# Patient Record
Sex: FEMALE | Race: WHITE | NOT HISPANIC OR LATINO | URBAN - METROPOLITAN AREA
[De-identification: names, ages, dates, MRNs, and addresses within clinical notes are randomized per-mention and may not be internally consistent; named-entity substitution may affect disease eponyms.]

---

## 2017-01-25 ENCOUNTER — OUTPATIENT (OUTPATIENT)
Dept: OUTPATIENT SERVICES | Facility: HOSPITAL | Age: 67
LOS: 1 days | End: 2017-01-25

## 2017-09-16 ENCOUNTER — TRANSCRIPTION ENCOUNTER (OUTPATIENT)
Age: 67
End: 2017-09-16

## 2020-06-04 ENCOUNTER — TRANSCRIPTION ENCOUNTER (OUTPATIENT)
Age: 70
End: 2020-06-04

## 2020-08-11 ENCOUNTER — OUTPATIENT (OUTPATIENT)
Dept: OUTPATIENT SERVICES | Facility: HOSPITAL | Age: 70
LOS: 1 days | End: 2020-08-11

## 2020-10-30 ENCOUNTER — OUTPATIENT (OUTPATIENT)
Dept: OUTPATIENT SERVICES | Facility: HOSPITAL | Age: 70
LOS: 1 days | End: 2020-10-30

## 2021-01-19 ENCOUNTER — OUTPATIENT (OUTPATIENT)
Dept: OUTPATIENT SERVICES | Facility: HOSPITAL | Age: 71
LOS: 1 days | End: 2021-01-19

## 2021-04-27 ENCOUNTER — OUTPATIENT (OUTPATIENT)
Dept: OUTPATIENT SERVICES | Facility: HOSPITAL | Age: 71
LOS: 1 days | End: 2021-04-27

## 2021-06-30 ENCOUNTER — OUTPATIENT (OUTPATIENT)
Dept: OUTPATIENT SERVICES | Facility: HOSPITAL | Age: 71
LOS: 1 days | End: 2021-06-30

## 2021-08-30 ENCOUNTER — OUTPATIENT (OUTPATIENT)
Dept: OUTPATIENT SERVICES | Facility: HOSPITAL | Age: 71
LOS: 1 days | End: 2021-08-30

## 2021-10-14 ENCOUNTER — APPOINTMENT (OUTPATIENT)
Dept: MAMMOGRAPHY | Facility: CLINIC | Age: 71
End: 2021-10-14
Payer: MEDICARE

## 2021-10-14 ENCOUNTER — APPOINTMENT (OUTPATIENT)
Dept: RADIOLOGY | Facility: CLINIC | Age: 71
End: 2021-10-14

## 2021-10-14 PROCEDURE — 77067 SCR MAMMO BI INCL CAD: CPT

## 2021-10-14 PROCEDURE — 77063 BREAST TOMOSYNTHESIS BI: CPT

## 2021-10-14 PROCEDURE — 77080 DXA BONE DENSITY AXIAL: CPT

## 2021-11-02 ENCOUNTER — OUTPATIENT (OUTPATIENT)
Dept: OUTPATIENT SERVICES | Facility: HOSPITAL | Age: 71
LOS: 1 days | End: 2021-11-02

## 2021-11-30 ENCOUNTER — OUTPATIENT (OUTPATIENT)
Dept: OUTPATIENT SERVICES | Facility: HOSPITAL | Age: 71
LOS: 1 days | End: 2021-11-30

## 2022-02-04 ENCOUNTER — OUTPATIENT (OUTPATIENT)
Dept: OUTPATIENT SERVICES | Facility: HOSPITAL | Age: 72
LOS: 1 days | End: 2022-02-04

## 2022-02-04 DIAGNOSIS — R73.09 OTHER ABNORMAL GLUCOSE: ICD-10-CM

## 2022-02-04 DIAGNOSIS — E78.5 HYPERLIPIDEMIA, UNSPECIFIED: ICD-10-CM

## 2022-02-04 DIAGNOSIS — I10 ESSENTIAL (PRIMARY) HYPERTENSION: ICD-10-CM

## 2022-05-13 ENCOUNTER — OUTPATIENT (OUTPATIENT)
Dept: OUTPATIENT SERVICES | Facility: HOSPITAL | Age: 72
LOS: 1 days | End: 2022-05-13

## 2022-05-13 DIAGNOSIS — E78.5 HYPERLIPIDEMIA, UNSPECIFIED: ICD-10-CM

## 2022-05-13 DIAGNOSIS — I10 ESSENTIAL (PRIMARY) HYPERTENSION: ICD-10-CM

## 2022-05-13 DIAGNOSIS — R73.03 PREDIABETES: ICD-10-CM

## 2022-05-23 ENCOUNTER — OUTPATIENT (OUTPATIENT)
Dept: OUTPATIENT SERVICES | Facility: HOSPITAL | Age: 72
LOS: 1 days | End: 2022-05-23

## 2022-05-23 DIAGNOSIS — D75.1 SECONDARY POLYCYTHEMIA: ICD-10-CM

## 2022-08-16 ENCOUNTER — NON-APPOINTMENT (OUTPATIENT)
Age: 72
End: 2022-08-16

## 2022-08-19 ENCOUNTER — OUTPATIENT (OUTPATIENT)
Dept: OUTPATIENT SERVICES | Facility: HOSPITAL | Age: 72
LOS: 1 days | End: 2022-08-19
Payer: MEDICARE

## 2022-08-19 DIAGNOSIS — M79.81 NONTRAUMATIC HEMATOMA OF SOFT TISSUE: ICD-10-CM

## 2022-08-22 ENCOUNTER — RESULT REVIEW (OUTPATIENT)
Age: 72
End: 2022-08-22

## 2022-08-22 ENCOUNTER — APPOINTMENT (OUTPATIENT)
Dept: HEMATOLOGY ONCOLOGY | Facility: CLINIC | Age: 72
End: 2022-08-22

## 2022-08-22 VITALS
HEIGHT: 59 IN | OXYGEN SATURATION: 96 % | SYSTOLIC BLOOD PRESSURE: 126 MMHG | DIASTOLIC BLOOD PRESSURE: 94 MMHG | HEART RATE: 85 BPM | BODY MASS INDEX: 19.56 KG/M2 | WEIGHT: 97 LBS | TEMPERATURE: 98.2 F

## 2022-08-22 LAB
BASOPHILS # BLD AUTO: 0.04 K/UL — SIGNIFICANT CHANGE UP (ref 0–0.2)
BASOPHILS NFR BLD AUTO: 0.4 % — SIGNIFICANT CHANGE UP (ref 0–2)
EOSINOPHIL # BLD AUTO: 0.19 K/UL — SIGNIFICANT CHANGE UP (ref 0–0.5)
EOSINOPHIL NFR BLD AUTO: 1.7 % — SIGNIFICANT CHANGE UP (ref 0–6)
HCT VFR BLD CALC: 47.7 % — HIGH (ref 34.5–45)
HGB BLD-MCNC: 15.7 G/DL — HIGH (ref 11.5–15.5)
IMM GRANULOCYTES NFR BLD AUTO: 0.4 % — SIGNIFICANT CHANGE UP (ref 0–1.5)
LYMPHOCYTES # BLD AUTO: 19.3 % — SIGNIFICANT CHANGE UP (ref 13–44)
LYMPHOCYTES # BLD AUTO: 2.17 K/UL — SIGNIFICANT CHANGE UP (ref 1–3.3)
MCHC RBC-ENTMCNC: 31.7 PG — SIGNIFICANT CHANGE UP (ref 27–34)
MCHC RBC-ENTMCNC: 32.9 GM/DL — SIGNIFICANT CHANGE UP (ref 32–36)
MCV RBC AUTO: 96.2 FL — SIGNIFICANT CHANGE UP (ref 80–100)
MONOCYTES # BLD AUTO: 0.7 K/UL — SIGNIFICANT CHANGE UP (ref 0–0.9)
MONOCYTES NFR BLD AUTO: 6.2 % — SIGNIFICANT CHANGE UP (ref 2–14)
NEUTROPHILS # BLD AUTO: 8.08 K/UL — HIGH (ref 1.8–7.4)
NEUTROPHILS NFR BLD AUTO: 72 % — SIGNIFICANT CHANGE UP (ref 43–77)
NRBC # BLD: 0 /100 WBCS — SIGNIFICANT CHANGE UP (ref 0–0)
PLATELET # BLD AUTO: 342 K/UL — SIGNIFICANT CHANGE UP (ref 150–400)
RBC # BLD: 4.96 M/UL — SIGNIFICANT CHANGE UP (ref 3.8–5.2)
RBC # FLD: 12.8 % — SIGNIFICANT CHANGE UP (ref 10.3–14.5)
WBC # BLD: 11.22 K/UL — HIGH (ref 3.8–10.5)
WBC # FLD AUTO: 11.22 K/UL — HIGH (ref 3.8–10.5)

## 2022-08-22 PROCEDURE — 99204 OFFICE O/P NEW MOD 45 MIN: CPT

## 2022-08-22 RX ORDER — NIRMATRELVIR AND RITONAVIR 300-100 MG
20 X 150 MG & KIT ORAL
Qty: 30 | Refills: 0 | Status: DISCONTINUED | COMMUNITY
Start: 2022-06-21 | End: 2022-08-22

## 2022-08-22 RX ORDER — HYDROCHLOROTHIAZIDE 12.5 MG/1
12.5 CAPSULE ORAL
Qty: 90 | Refills: 0 | Status: ACTIVE | COMMUNITY
Start: 2022-05-13

## 2022-08-22 RX ORDER — MONTELUKAST 10 MG/1
10 TABLET, FILM COATED ORAL
Qty: 90 | Refills: 0 | Status: ACTIVE | COMMUNITY
Start: 2022-03-09

## 2022-08-22 RX ORDER — AMITRIPTYLINE HYDROCHLORIDE 75 MG/1
75 TABLET, FILM COATED ORAL
Qty: 90 | Refills: 0 | Status: ACTIVE | COMMUNITY
Start: 2022-08-05

## 2022-08-22 RX ORDER — POLYETHYLENE GLYCOL-3350 AND ELECTROLYTES 236; 6.74; 5.86; 2.97; 22.74 G/274.31G; G/274.31G; G/274.31G; G/274.31G; G/274.31G
236 POWDER, FOR SOLUTION ORAL
Qty: 4000 | Refills: 0 | Status: ACTIVE | COMMUNITY
Start: 2022-04-07

## 2022-08-22 RX ORDER — ALBUTEROL SULFATE 90 UG/1
108 (90 BASE) AEROSOL, METERED RESPIRATORY (INHALATION)
Qty: 36 | Refills: 0 | Status: ACTIVE | COMMUNITY
Start: 2022-08-05

## 2022-08-22 RX ORDER — AMLODIPINE BESYLATE 5 MG/1
5 TABLET ORAL
Qty: 90 | Refills: 0 | Status: ACTIVE | COMMUNITY
Start: 2022-05-13

## 2022-08-23 LAB
ALBUMIN SERPL ELPH-MCNC: 4.9 G/DL
ALP BLD-CCNC: 86 U/L
ALT SERPL-CCNC: 25 U/L
ANION GAP SERPL CALC-SCNC: 15 MMOL/L
APTT BLD: 35.5 SEC
AST SERPL-CCNC: 29 U/L
B2 MICROGLOB SERPL-MCNC: 1.4 MG/L
BILIRUB SERPL-MCNC: 0.3 MG/DL
BUN SERPL-MCNC: 7 MG/DL
CALCIUM SERPL-MCNC: 10.1 MG/DL
CANCER AG125 SERPL-ACNC: 11 U/ML
CANCER AG15-3 SERPL-ACNC: 7 U/ML
CANCER AG19-9 SERPL-ACNC: 8 U/ML
CANCER AG27-29 SERPL-ACNC: 10.5 U/ML
CEA SERPL-MCNC: 5 NG/ML
CHLORIDE SERPL-SCNC: 99 MMOL/L
CO2 SERPL-SCNC: 28 MMOL/L
CREAT SERPL-MCNC: 0.55 MG/DL
CRP SERPL-MCNC: <3 MG/L
EGFR: 97 ML/MIN/1.73M2
ERYTHROCYTE [SEDIMENTATION RATE] IN BLOOD BY WESTERGREN METHOD: 11 MM/HR
FERRITIN SERPL-MCNC: 93 NG/ML
FOLATE SERPL-MCNC: 18.7 NG/ML
GLUCOSE SERPL-MCNC: 91 MG/DL
INR PPP: 1.05 RATIO
IRON SATN MFR SERPL: 41 %
IRON SERPL-MCNC: 132 UG/DL
POTASSIUM SERPL-SCNC: 4 MMOL/L
PROT SERPL-MCNC: 6.8 G/DL
PT BLD: 12.2 SEC
SODIUM SERPL-SCNC: 143 MMOL/L
TIBC SERPL-MCNC: 320 UG/DL
TSH SERPL-ACNC: 2.19 UIU/ML
UIBC SERPL-MCNC: 188 UG/DL
VIT B12 SERPL-MCNC: 683 PG/ML

## 2022-08-23 NOTE — PHYSICAL EXAM
[Restricted in physically strenuous activity but ambulatory and able to carry out work of a light or sedentary nature] : Status 1- Restricted in physically strenuous activity but ambulatory and able to carry out work of a light or sedentary nature, e.g., light house work, office work [Thin] : thin [Normal] : affect appropriate [de-identified] : Thin female, NAD, pleasant

## 2022-08-23 NOTE — CONSULT LETTER
[Dear  ___] : Dear  [unfilled], [Consult Letter:] : I had the pleasure of evaluating your patient, [unfilled]. [Please see my note below.] : Please see my note below. [Consult Closing:] : Thank you very much for allowing me to participate in the care of this patient.  If you have any questions, please do not hesitate to contact me. [Sincerely,] : Sincerely, [FreeTextEntry2] : Dr. Lyly Shah  [FreeTextEntry3] : Dr. Cari Hernandez\par

## 2022-08-23 NOTE — HISTORY OF PRESENT ILLNESS
[de-identified] : Referred by: Lyly Shah\par \par Ms. Shipley presented at age 71 in 2022 for evaluation of weight loss and thrombocytosis.\par The patient has a medical history of COPD, depression, HTN. \par \par At today's visit Margo reports that she was sent in for evaluation of weight loss and elevated platelet count. She states she has had easy bruising over the past 1.5-2 years. She has been losing weight gradually, previously 107, now 85lb. She reports that she is easily full when eating. She had a L breast biopsy 5-6 years ago which revealed a cyst. She has not had any abdominal imaging at this time. She follows with pulmonology at [place] Dr. Persaud for COPD and is due for a repeat CT chest for lung nodules. \par She otherwise denies fevers, chills, lymphadenopathy, chest pain, n/v/d, LE swelling. She has no personal or family history of bleeding or blood disorders. \par \par Laboratory studies from 22 reviewed and notable for: WBC 10, Hb 16.1, Plt 536 \par Laboratory studies reviewed at today's visit and notable for: WBC 11.22, Hb 15.7, Plt 342\par B2 microglobulin, iron studies B12, folate, ferritin, TSH, CMP, CRP, ESR, coags, CA 15-3, , CA 19-9, CA 27.29 all WNL\par CEA 5 (former smoker) \par \par HCM: \par - COVID vaccination: s/p 2 doses and 2 boosters \par - Colonoscopy: 2022- needs repeat prep\par - Gyn: no longer screening, had hysterectomy at age 28, still has ovaries intact,  \par - Mammo: goes annually, UTD\par - DEXA: normal \par \par SH: \par - Occupation: worked doing payroll \par - Living situation: live [facility] with her  \par - Smoking/etoh/illicits: quit 16 years ago, smoked 5-6 cigarettes/day for many years, denies etoh, illicits \par - Exercise: fairly physically active \par \par FH: \par - Her mother  of lung cancer, age 75, smoker\par - Her younger sister had kidney cancer, s/p nephrectomy

## 2022-08-23 NOTE — RESULTS/DATA
[FreeTextEntry1] : Ms. Shipley presented at age 71 in August 2022 for evaluation of weight loss and thrombocytosis.\par The patient has a medical history of COPD, depression, HTN. \par \par Laboratory studies reviewed at today's visit and notable for: WBC 11.22, Hb 15.7, Plt 342\par B2 microglobulin, iron studies B12, folate, ferritin, TSH, CMP, CRP, ESR, coags, CA 15-3, , CA 19-9, CA 27.29 all WNL\par CEA 5 (former smoker) \par \par No clear etiology of weight loss at this time. Follow up imaging studies. \par Needs repeat colonoscopy prep, especially given elevated CEA. \par Follow up workup for myeloproliferative disorders.

## 2022-08-24 ENCOUNTER — NON-APPOINTMENT (OUTPATIENT)
Age: 72
End: 2022-08-24

## 2022-08-24 LAB — GENE XXX MUT ANL BLD/T: NORMAL

## 2022-08-25 LAB — JAK2 GENE MUT ANL BLD/T: NORMAL

## 2022-09-05 LAB — MPL EXON 10 MUTATION: NORMAL

## 2022-09-10 ENCOUNTER — TRANSCRIPTION ENCOUNTER (OUTPATIENT)
Age: 72
End: 2022-09-10

## 2022-09-12 ENCOUNTER — RESULT REVIEW (OUTPATIENT)
Age: 72
End: 2022-09-12

## 2022-09-12 ENCOUNTER — APPOINTMENT (OUTPATIENT)
Dept: HEMATOLOGY ONCOLOGY | Facility: CLINIC | Age: 72
End: 2022-09-12

## 2022-09-12 VITALS
OXYGEN SATURATION: 97 % | TEMPERATURE: 96.4 F | WEIGHT: 96.4 LBS | BODY MASS INDEX: 19.44 KG/M2 | SYSTOLIC BLOOD PRESSURE: 153 MMHG | DIASTOLIC BLOOD PRESSURE: 84 MMHG | HEART RATE: 80 BPM | HEIGHT: 59 IN

## 2022-09-12 LAB
BASOPHILS # BLD AUTO: 0.06 K/UL — SIGNIFICANT CHANGE UP (ref 0–0.2)
BASOPHILS NFR BLD AUTO: 0.5 % — SIGNIFICANT CHANGE UP (ref 0–2)
EOSINOPHIL # BLD AUTO: 0.14 K/UL — SIGNIFICANT CHANGE UP (ref 0–0.5)
EOSINOPHIL NFR BLD AUTO: 1.3 % — SIGNIFICANT CHANGE UP (ref 0–6)
HCT VFR BLD CALC: 46 % — HIGH (ref 34.5–45)
HGB BLD-MCNC: 15.5 G/DL — SIGNIFICANT CHANGE UP (ref 11.5–15.5)
IMM GRANULOCYTES NFR BLD AUTO: 0.3 % — SIGNIFICANT CHANGE UP (ref 0–1.5)
LYMPHOCYTES # BLD AUTO: 17.9 % — SIGNIFICANT CHANGE UP (ref 13–44)
LYMPHOCYTES # BLD AUTO: 2 K/UL — SIGNIFICANT CHANGE UP (ref 1–3.3)
MCHC RBC-ENTMCNC: 32.3 PG — SIGNIFICANT CHANGE UP (ref 27–34)
MCHC RBC-ENTMCNC: 33.7 GM/DL — SIGNIFICANT CHANGE UP (ref 32–36)
MCV RBC AUTO: 95.8 FL — SIGNIFICANT CHANGE UP (ref 80–100)
MONOCYTES # BLD AUTO: 0.63 K/UL — SIGNIFICANT CHANGE UP (ref 0–0.9)
MONOCYTES NFR BLD AUTO: 5.6 % — SIGNIFICANT CHANGE UP (ref 2–14)
NEUTROPHILS # BLD AUTO: 8.33 K/UL — HIGH (ref 1.8–7.4)
NEUTROPHILS NFR BLD AUTO: 74.4 % — SIGNIFICANT CHANGE UP (ref 43–77)
NRBC # BLD: 0 /100 WBCS — SIGNIFICANT CHANGE UP (ref 0–0)
PLATELET # BLD AUTO: 456 K/UL — HIGH (ref 150–400)
RBC # BLD: 4.8 M/UL — SIGNIFICANT CHANGE UP (ref 3.8–5.2)
RBC # FLD: 12.7 % — SIGNIFICANT CHANGE UP (ref 10.3–14.5)
WBC # BLD: 11.19 K/UL — HIGH (ref 3.8–10.5)
WBC # FLD AUTO: 11.19 K/UL — HIGH (ref 3.8–10.5)

## 2022-09-12 PROCEDURE — 85027 COMPLETE CBC AUTOMATED: CPT

## 2022-09-12 PROCEDURE — 99214 OFFICE O/P EST MOD 30 MIN: CPT

## 2022-09-12 NOTE — PHYSICAL EXAM
[Restricted in physically strenuous activity but ambulatory and able to carry out work of a light or sedentary nature] : Status 1- Restricted in physically strenuous activity but ambulatory and able to carry out work of a light or sedentary nature, e.g., light house work, office work [Thin] : thin [Normal] : affect appropriate [de-identified] : Thin female, NAD, pleasant

## 2022-09-12 NOTE — RESULTS/DATA
[FreeTextEntry1] : Ms. Shipley presented at age 71 in August 2022 for evaluation of weight loss and thrombocytosis.\par The patient has a medical history of COPD, depression, HTN. \par \par Laboratory studies reviewed at today's visit and notable for: WBC 11.22, Hb 15.7, Plt 342\par B2 microglobulin, iron studies B12, folate, ferritin, TSH, CMP, CRP, ESR, coags, CA 15-3, , CA 19-9, CA 27.29 all WNL\par CEA 5 (former smoker) \par \par No clear etiology of weight loss at this time. \par Needs repeat colonoscopy prep, especially given elevated CEA.\par Workup otherwise negative. \par CT CAP without obvious cause for weight loss. Stable lung nodules.

## 2022-09-12 NOTE — HISTORY OF PRESENT ILLNESS
[de-identified] : Referred by: Lyly Shah\par \par Ms. Shipley presented at age 71 in 2022 for evaluation of weight loss and thrombocytosis.\par The patient has a medical history of COPD, depression, HTN. \par \par Presenting HPI: At today's visit Margo reports that she was sent in for evaluation of weight loss and elevated platelet count. She states she has had easy bruising over the past 1.5-2 years. She has been losing weight gradually, previously 107, now 85lb. She reports that she is easily full when eating. She had a L breast biopsy 5-6 years ago which revealed a cyst. She has not had any abdominal imaging at this time. She follows with pulmonology at [place] Dr. Persaud for COPD and is due for a repeat CT chest for lung nodules. \par She otherwise denies fevers, chills, lymphadenopathy, chest pain, n/v/d, LE swelling. She has no personal or family history of bleeding or blood disorders. \par \par Laboratory studies from 22 reviewed and notable for: WBC 10, Hb 16.1, Plt 536 \par Laboratory studies reviewed at today's visit and notable for: WBC 11.22, Hb 15.7, Plt 342\par B2 microglobulin, iron studies B12, folate, ferritin, TSH, CMP, CRP, ESR, coags, CA 15-3, , CA 19-9, CA 27.29 all WNL\par CEA 5 (former smoker) \par \par HCM: \par - COVID vaccination: s/p 2 doses and 2 boosters \par - Colonoscopy: 2022- needs repeat prep\par - Gyn: no longer screening, had hysterectomy at age 28, still has ovaries intact,  \par - Mammo: goes annually, UTD\par - DEXA: normal \par \par SH: \par - Occupation: worked doing payroll \par - Living situation: live [facility] with her  \par - Smoking/etoh/illicits: quit 16 years ago, smoked 5-6 cigarettes/day for many years, denies etoh, illicits \par - Exercise: fairly physically active \par \par FH: \par - Her mother  of lung cancer, age 75, smoker\par - Her younger sister had kidney cancer, s/p nephrectomy [de-identified] : Margo presents on 9/12/22 for follow up for weight loss. \par \par     Laboratory studies reviewed from prior visit and notable for: WBC 11.22, Hb 15.7, Plt 342\par     B2 microglobulin, iron studies B12, folate, ferritin, TSH, CMP, CRP, ESR, coags, CA 15-3,\par     , CA 19-9, CA 27.29 all WNL\par     CEA 5 (former smoker) \par     CALR, JAK2, MPL negative, flow negative \par \par At today's visit Margo is feeling well. She is drinking Boost supplement- weight is stable. She underwent CT CAP 9/9/22 which did not show any obvious etiology of weight loss- notable for biliary dilatation, chronic, severe COPD with 2 tiny endobronchial filling defects, ?respiratory secretions. All other pulmonary nodules are unchanged. \par She denies fevers, chills, CP, SOB, n/v/d. \par \par Laboratory studies reviewed at today's visit and notable for: WBC 11.19, Hb 15.5, Plt 456 \par

## 2022-09-13 LAB — CEA SERPL-MCNC: 4.5 NG/ML

## 2022-10-21 ENCOUNTER — OUTPATIENT (OUTPATIENT)
Dept: OUTPATIENT SERVICES | Facility: HOSPITAL | Age: 72
LOS: 1 days | End: 2022-10-21

## 2022-10-21 DIAGNOSIS — M79.81 NONTRAUMATIC HEMATOMA OF SOFT TISSUE: ICD-10-CM

## 2022-10-21 DIAGNOSIS — D64.9 ANEMIA, UNSPECIFIED: ICD-10-CM

## 2022-10-24 ENCOUNTER — APPOINTMENT (OUTPATIENT)
Dept: HEMATOLOGY ONCOLOGY | Facility: CLINIC | Age: 72
End: 2022-10-24

## 2022-12-12 ENCOUNTER — APPOINTMENT (OUTPATIENT)
Dept: HEMATOLOGY ONCOLOGY | Facility: CLINIC | Age: 72
End: 2022-12-12

## 2023-09-28 ENCOUNTER — APPOINTMENT (OUTPATIENT)
Dept: CARDIOLOGY | Facility: CLINIC | Age: 73
End: 2023-09-28
Payer: MEDICARE

## 2023-09-28 VITALS
WEIGHT: 99 LBS | OXYGEN SATURATION: 99 % | HEART RATE: 76 BPM | BODY MASS INDEX: 19.96 KG/M2 | RESPIRATION RATE: 14 BRPM | DIASTOLIC BLOOD PRESSURE: 84 MMHG | HEIGHT: 59 IN | SYSTOLIC BLOOD PRESSURE: 124 MMHG

## 2023-09-28 DIAGNOSIS — R94.31 ABNORMAL ELECTROCARDIOGRAM [ECG] [EKG]: ICD-10-CM

## 2023-09-28 DIAGNOSIS — R06.09 OTHER FORMS OF DYSPNEA: ICD-10-CM

## 2023-09-28 DIAGNOSIS — R60.0 LOCALIZED EDEMA: ICD-10-CM

## 2023-09-28 DIAGNOSIS — J45.20 MILD INTERMITTENT ASTHMA, UNCOMPLICATED: ICD-10-CM

## 2023-09-28 DIAGNOSIS — I10 ESSENTIAL (PRIMARY) HYPERTENSION: ICD-10-CM

## 2023-09-28 DIAGNOSIS — R63.4 ABNORMAL WEIGHT LOSS: ICD-10-CM

## 2023-09-28 DIAGNOSIS — E78.00 PURE HYPERCHOLESTEROLEMIA, UNSPECIFIED: ICD-10-CM

## 2023-09-28 DIAGNOSIS — Z87.891 PERSONAL HISTORY OF NICOTINE DEPENDENCE: ICD-10-CM

## 2023-09-28 DIAGNOSIS — D75.839 THROMBOCYTOSIS, UNSPECIFIED: ICD-10-CM

## 2023-09-28 DIAGNOSIS — R00.2 PALPITATIONS: ICD-10-CM

## 2023-09-28 PROCEDURE — 99204 OFFICE O/P NEW MOD 45 MIN: CPT

## 2023-09-28 RX ORDER — TIOTROPIUM BROMIDE INHALATION SPRAY 3.12 UG/1
2.5 SPRAY, METERED RESPIRATORY (INHALATION)
Refills: 0 | Status: ACTIVE | COMMUNITY

## 2023-09-28 RX ORDER — GUAIFENESIN 600 MG/1
600 TABLET, EXTENDED RELEASE ORAL
Refills: 0 | Status: ACTIVE | COMMUNITY

## 2023-09-28 RX ORDER — AZITHROMYCIN 250 MG/1
250 TABLET, FILM COATED ORAL
Qty: 6 | Refills: 0 | Status: DISCONTINUED | COMMUNITY
Start: 2022-03-09 | End: 2023-09-28

## 2023-09-28 RX ORDER — PREDNISONE 10 MG/1
10 TABLET ORAL
Qty: 20 | Refills: 0 | Status: DISCONTINUED | COMMUNITY
Start: 2022-03-30 | End: 2023-09-28

## 2023-09-28 RX ORDER — ALBUTEROL SULFATE 90 UG/1
108 (90 BASE) INHALANT RESPIRATORY (INHALATION)
Refills: 0 | Status: ACTIVE | COMMUNITY

## 2023-09-28 RX ORDER — BUDESONIDE AND FORMOTEROL FUMARATE DIHYDRATE 160; 4.5 UG/1; UG/1
160-4.5 AEROSOL RESPIRATORY (INHALATION)
Qty: 31 | Refills: 0 | Status: DISCONTINUED | COMMUNITY
Start: 2022-08-05 | End: 2023-09-28

## 2023-09-28 RX ORDER — FLUTICASONE FUROATE AND VILANTEROL TRIFENATATE 50; 25 UG/1; UG/1
POWDER RESPIRATORY (INHALATION)
Refills: 0 | Status: ACTIVE | COMMUNITY

## 2023-09-28 RX ORDER — LEVOFLOXACIN 500 MG/1
500 TABLET, FILM COATED ORAL
Qty: 7 | Refills: 0 | Status: DISCONTINUED | COMMUNITY
Start: 2022-03-30 | End: 2023-09-28

## 2023-09-28 RX ORDER — ATENOLOL 100 MG/1
100 TABLET ORAL DAILY
Refills: 0 | Status: ACTIVE | COMMUNITY

## 2023-09-28 RX ORDER — ROSUVASTATIN CALCIUM 20 MG/1
20 TABLET, FILM COATED ORAL DAILY
Refills: 0 | Status: ACTIVE | COMMUNITY

## 2024-01-16 ENCOUNTER — APPOINTMENT (OUTPATIENT)
Dept: MAMMOGRAPHY | Facility: CLINIC | Age: 74
End: 2024-01-16
Payer: MEDICARE

## 2024-01-16 ENCOUNTER — APPOINTMENT (OUTPATIENT)
Dept: RADIOLOGY | Facility: CLINIC | Age: 74
End: 2024-01-16
Payer: MEDICARE

## 2024-01-16 PROCEDURE — 77067 SCR MAMMO BI INCL CAD: CPT

## 2024-01-16 PROCEDURE — 77080 DXA BONE DENSITY AXIAL: CPT

## 2024-01-16 PROCEDURE — 77063 BREAST TOMOSYNTHESIS BI: CPT

## 2024-02-06 ENCOUNTER — OFFICE (OUTPATIENT)
Dept: URBAN - METROPOLITAN AREA CLINIC 8 | Facility: CLINIC | Age: 74
Setting detail: OPHTHALMOLOGY
End: 2024-02-06
Payer: MEDICARE

## 2024-02-06 DIAGNOSIS — H16.223: ICD-10-CM

## 2024-02-06 DIAGNOSIS — H25.13: ICD-10-CM

## 2024-02-06 DIAGNOSIS — H52.4: ICD-10-CM

## 2024-02-06 PROCEDURE — 92015 DETERMINE REFRACTIVE STATE: CPT

## 2024-02-06 PROCEDURE — 92004 COMPRE OPH EXAM NEW PT 1/>: CPT

## 2024-02-06 ASSESSMENT — REFRACTION_MANIFEST
OS_VA1: 20/25-2
OD_AXIS: 080
OS_AXIS: 100
OD_CYLINDER: -0.75
OS_SPHERE: +1.25
OD_AXIS: 075
OS_AXIS: 100
OD_SPHERE: +0.75
OU_VA: 20/25-
OS_SPHERE: +0.75
OD_VA1: 20/25-
OS_CYLINDER: -1.00
OS_VA1: 20/30-
OS_ADD: +2.50
OD_SPHERE: +1.00
OU_VA: 20/25-
OS_CYLINDER: -0.75
OD_VA1: 20/40
OD_CYLINDER: -0.75
OD_ADD: +2.50

## 2024-02-06 ASSESSMENT — REFRACTION_AUTOREFRACTION
OD_AXIS: 082
OS_SPHERE: +1.50
OD_SPHERE: +1.50
OS_CYLINDER: -1.50
OS_AXIS: 102
OD_CYLINDER: -1.50

## 2024-02-06 ASSESSMENT — SPHEQUIV_DERIVED
OD_SPHEQUIV: 0.375
OD_SPHEQUIV: 0.625
OS_SPHEQUIV: 0.75
OD_SPHEQUIV: 0.75
OS_SPHEQUIV: 0.375
OS_SPHEQUIV: 0.75

## 2024-02-06 ASSESSMENT — REFRACTION_CURRENTRX
OD_OVR_VA: 20/
OD_SPHERE: +2.00
OS_ADD: +2.00
OD_CYLINDER: -1.25
OS_CYLINDER: -0.75
OD_AXIS: 074
OD_VPRISM_DIRECTION: PROGS
OS_OVR_VA: 20/
OD_ADD: +2.00
OS_VPRISM_DIRECTION: PROGS
OS_SPHERE: +1.50
OS_AXIS: 120

## 2024-02-06 ASSESSMENT — SUPERFICIAL PUNCTATE KERATITIS (SPK)
OD_SPK: 1+ 2+
OS_SPK: 1+ 2+

## 2024-02-06 ASSESSMENT — CONFRONTATIONAL VISUAL FIELD TEST (CVF)
OD_FINDINGS: FULL
OS_FINDINGS: FULL

## 2024-08-07 ENCOUNTER — NON-APPOINTMENT (OUTPATIENT)
Age: 74
End: 2024-08-07